# Patient Record
Sex: FEMALE | Race: BLACK OR AFRICAN AMERICAN | NOT HISPANIC OR LATINO | Employment: FULL TIME | URBAN - METROPOLITAN AREA
[De-identification: names, ages, dates, MRNs, and addresses within clinical notes are randomized per-mention and may not be internally consistent; named-entity substitution may affect disease eponyms.]

---

## 2024-01-04 ENCOUNTER — HOSPITAL ENCOUNTER (EMERGENCY)
Facility: HOSPITAL | Age: 64
Discharge: HOME | End: 2024-01-04
Attending: EMERGENCY MEDICINE
Payer: MEDICAID

## 2024-01-04 VITALS
DIASTOLIC BLOOD PRESSURE: 65 MMHG | SYSTOLIC BLOOD PRESSURE: 145 MMHG | HEART RATE: 77 BPM | BODY MASS INDEX: 28.14 KG/M2 | TEMPERATURE: 99 F | RESPIRATION RATE: 20 BRPM | HEIGHT: 69 IN | OXYGEN SATURATION: 99 % | WEIGHT: 190 LBS

## 2024-01-04 DIAGNOSIS — J06.9 UPPER RESPIRATORY TRACT INFECTION, UNSPECIFIED TYPE: Primary | ICD-10-CM

## 2024-01-04 DIAGNOSIS — R05.9 COUGH: ICD-10-CM

## 2024-01-04 LAB
INFLUENZA A, MOLECULAR: NEGATIVE
INFLUENZA B, MOLECULAR: NEGATIVE
SARS-COV-2 RDRP RESP QL NAA+PROBE: NEGATIVE
SPECIMEN SOURCE: NORMAL

## 2024-01-04 PROCEDURE — 99284 EMERGENCY DEPT VISIT MOD MDM: CPT | Mod: 25

## 2024-01-04 PROCEDURE — 25000003 PHARM REV CODE 250: Performed by: EMERGENCY MEDICINE

## 2024-01-04 PROCEDURE — U0002 COVID-19 LAB TEST NON-CDC: HCPCS | Performed by: NURSE PRACTITIONER

## 2024-01-04 PROCEDURE — 87502 INFLUENZA DNA AMP PROBE: CPT | Performed by: PHYSICIAN ASSISTANT

## 2024-01-04 RX ORDER — FEXOFENADINE HCL 60 MG
60 TABLET ORAL 2 TIMES DAILY
Qty: 14 TABLET | Refills: 0 | Status: SHIPPED | OUTPATIENT
Start: 2024-01-04 | End: 2024-01-11

## 2024-01-04 RX ORDER — DOXYCYCLINE 100 MG/1
100 CAPSULE ORAL 2 TIMES DAILY
Qty: 9 CAPSULE | Refills: 0 | Status: SHIPPED | OUTPATIENT
Start: 2024-01-04 | End: 2024-01-09

## 2024-01-04 RX ORDER — BENZONATATE 200 MG/1
200 CAPSULE ORAL 3 TIMES DAILY PRN
Qty: 15 CAPSULE | Refills: 0 | Status: SHIPPED | OUTPATIENT
Start: 2024-01-04 | End: 2024-01-09

## 2024-01-04 RX ORDER — DOXYCYCLINE HYCLATE 100 MG
100 TABLET ORAL
Status: COMPLETED | OUTPATIENT
Start: 2024-01-04 | End: 2024-01-04

## 2024-01-04 RX ADMIN — DOXYCYCLINE HYCLATE 100 MG: 100 TABLET, FILM COATED ORAL at 11:01

## 2024-01-05 NOTE — ED PROVIDER NOTES
Encounter Date: 1/4/2024       History     Chief Complaint   Patient presents with    Cough     Reports yellow and green sputum     This patient is a 63-year-old female No past medical history on file.  Presenting to the emergency department complaints of cough for 3 days.  She reports chest congestion and now she is coughing up yellow to greenish sputum.  She denies a history of chronic lung disease or smoking.  She reports having 1 person in the household with similar symptoms prior to onset of her own.  She denies significant wheezing or difficulty breathing.  No extremity swelling or past history of DVT or PE or congestive heart failure.  She denies chest pain arrest, nausea, vomiting, abdominal pain.  No pain or cough medications taken prior to arrival.  She denies past history of pneumonia.      Review of patient's allergies indicates:  Not on File  No past medical history on file.  No past surgical history on file.  No family history on file.     Review of Systems    Physical Exam     Initial Vitals [01/04/24 1645]   BP Pulse Resp Temp SpO2   (!) 161/71 88 20 99.7 °F (37.6 °C) 97 %      MAP       --         Physical Exam    Nursing note and vitals reviewed.  Constitutional: She appears well-developed and well-nourished.   HENT:   Head: Normocephalic and atraumatic.   Eyes: Conjunctivae and EOM are normal.   Neck:   Normal range of motion.  Cardiovascular:  Normal rate and regular rhythm.           Pulmonary/Chest: No respiratory distress. She has no wheezes. She has no rales.   Abdominal: She exhibits no distension.   Musculoskeletal:         General: Normal range of motion.      Cervical back: Normal range of motion.     Neurological: She is alert and oriented to person, place, and time. She has normal strength. No cranial nerve deficit. GCS score is 15. GCS eye subscore is 4. GCS verbal subscore is 5. GCS motor subscore is 6.   Skin: Skin is warm and dry. Capillary refill takes less than 2 seconds.  "  Psychiatric: She has a normal mood and affect. Thought content normal.         ED Course   Procedures  Labs Reviewed   INFLUENZA A & B BY MOLECULAR   SARS-COV-2 RNA AMPLIFICATION, QUAL          Imaging Results              X-Ray Chest PA And Lateral (Final result)  Result time 01/04/24 22:27:45      Final result by Srinath Bradshaw MD (01/04/24 22:27:45)                   Impression:      Minimal increased ground-glass attenuation overlying the lung bases and right upper lobe.  Suggest correlation for low-grade infectious/inflammatory process such as pneumonitis or small volume aspiration.    No confluent area of consolidation identified.      Electronically signed by: Srinath Bradshaw MD  Date:    01/04/2024  Time:    22:27               Narrative:    EXAMINATION:  XR CHEST PA AND LATERAL    CLINICAL HISTORY:  Provided history is "  Cough, unspecified".    TECHNIQUE:  Frontal and lateral views of the chest were performed.    COMPARISON:  None.    FINDINGS:  Cardiac silhouette is not enlarged. Scarring versus subsegmental atelectasis or minimal ground-glass opacities in the right upper lobe.  Minimal increased ground-glass attenuation overlying the lung bases which could be exaggerated by soft tissue attenuation of the x-ray beam.  No confluent area of consolidation.  Mild biapical scarring.  No sizable pleural effusion.  No pneumothorax.                                       Medications   doxycycline tablet 100 mg (100 mg Oral Given 1/4/24 8399)     Medical Decision Making  Patient was emergently assessed shortly after arrival.  Initial vital signs are stable.  She exhibits normal work of breathing and oxygen saturations on room air.  Influenza and COVID testing are negative.  Chest x-ray indicates minimal increase in ground-glass attenuation over the lung bases and right upper lobe, may correlate to low-grade infectious or inflammatory process.  I do not suspect aspiration but will initiate therapy with " doxycycline for empiric coverage of atypical infectious process.  She is further educated about supportive care and will be discharged with antitussive and decongestant medication.  She is asked to monitor symptoms closely at home and follow up with her primary care doctor soon as possible.  She is asked to return the emergency department immediately for any new, concerning, or worsening symptoms including difficulty breathing.  Patient was agreeable with this plan and was discharged in stable condition.    Amount and/or Complexity of Data Reviewed  Radiology: ordered.    Risk  Prescription drug management.               ED Course as of 01/05/24 0119   u Jan 04, 2024 1938 SARS-CoV-2 RNA, Amplification, Qual: Negative [AT]   1939 Influenza A, Molecular: Negative [AT]   1939 Influenza B, Molecular: Negative [AT]   1939 BP(!): 161/71 [AT]   1939 Temp: 99.7 °F (37.6 °C) [AT]   1939 Temp Source: Oral [AT]   1939 Pulse: 88 [AT]   1939 Resp: 20 [AT]   1939 SpO2: 97 % [AT]      ED Course User Index  [AT] Kaelyn Chavez FNP                           Clinical Impression:  Final diagnoses:  [R05.9] Cough  [J06.9] Upper respiratory tract infection, unspecified type (Primary)          ED Disposition Condition    Discharge Stable          ED Prescriptions       Medication Sig Dispense Start Date End Date Auth. Provider    doxycycline (VIBRAMYCIN) 100 MG Cap Take 1 capsule (100 mg total) by mouth 2 (two) times daily. for 5 days 9 capsule 1/4/2024 1/9/2024 Deandre Marti MD    benzonatate (TESSALON) 200 MG capsule Take 1 capsule (200 mg total) by mouth 3 (three) times daily as needed for Cough. 15 capsule 1/4/2024 1/9/2024 Deandre Marti MD    fexofenadine (ALLEGRA) 60 MG tablet Take 1 tablet (60 mg total) by mouth 2 (two) times daily. for 7 days 14 tablet 1/4/2024 1/11/2024 Deandre Marti MD          Follow-up Information       Follow up With Specialties Details Why Contact Info    Post, MD [facility]  Medicine Schedule an appointment as soon as possible for a visit   [address] [facility] [address]  [phone]               Deandre Marti MD  01/05/24 0119

## 2024-01-05 NOTE — ED TRIAGE NOTES
Juan Woodard, a 63 y.o. female presents to the ED w/ complaint of cough. Pt reports coughing up yellow/green sputum since Monday. +Fevers and chills.     Triage note:  Chief Complaint   Patient presents with    Cough     Reports yellow and green sputum     Review of patient's allergies indicates:  Not on File  No past medical history on file.   Patient identifiers for Juan Woodard checked and correct.    LOC: The patient is awake, alert and aware of environment with an appropriate affect, the patient is oriented x 4 and speaking appropriately.    APPEARANCE: Patient resting comfortably and in no acute distress, patient is clean and well groomed, patient's clothing is properly fastened.    SKIN: The skin is warm and dry, color consistent with ethnicity, patient has normal skin turgor and moist mucus membranes, skin intact, no breakdown or bruising noted.    MUSCULOSKELETAL: Patient moving all extremities well, no obvious swelling or deformities noted.    RESPIRATORY: Airway is open and patent, respirations are spontaneous and even, patient has a normal effort and rate. +cough and yellow/green sputum     CARDIAC: Patient has a normal rate and rhythm, no periphreal edema noted, capillary refill < 3 seconds.    ABDOMEN: Soft and non tender to palpation, no distention noted. Patient denies any nausea, vomiting, diarrhea, or constipation.     NEUROLOGIC: Eyes open spontaneously, PERRL, behavior appropriate to situation, follows commands, facial expression symmetrical, bilateral hand grasp equal and even, purposeful motor response noted, normal sensation in all extremities.     HEENT: No abnormalities noted. White sclera and pupils equal round and reactive to light. Denies headache, dizziness.     : Pt voids independently, denies dysuria, hematuria, frequency.

## 2024-01-05 NOTE — FIRST PROVIDER EVALUATION
Emergency Department TeleTriage Encounter Note      CHIEF COMPLAINT    Chief Complaint   Patient presents with    Cough     Reports yellow and green sputum       VITAL SIGNS   Initial Vitals [01/04/24 1645]   BP Pulse Resp Temp SpO2   (!) 161/71 88 20 99.7 °F (37.6 °C) 97 %      MAP       --            ALLERGIES    Review of patient's allergies indicates:  Not on File    PROVIDER TRIAGE NOTE  Patient presents with complaint of cough and congestion for few days.  Reports productive cough.      Phy:   Constitutional: well nourished, well developed, appearing stated age, NAD        Initial orders will be placed and care will be transferred to an alternate provider when patient is roomed for a full evaluation. Any additional orders and the final disposition will be determined by that provider.        ORDERS  Labs Reviewed   HIV 1 / 2 ANTIBODY   HEPATITIS C ANTIBODY       ED Orders (720h ago, onward)      Start Ordered     Status Ordering Provider    01/04/24 1647 01/04/24 1646  HIV 1/2 Ag/Ab (4th Gen)  STAT         Ordered ELLY ROLAND    01/04/24 1647 01/04/24 1646  Hepatitis C Antibody  STAT         Ordered ELLY ROLAND              Virtual Visit Note: The provider triage portion of this emergency department evaluation and documentation was performed via [employer], a HIPAA-compliant telemedicine application, in concert with a tele-presenter in the room. A face to face patient evaluation with one of my colleagues will occur once the patient is placed in an emergency department room.      DISCLAIMER: This note was prepared with [employer] voice recognition transcription software. Garbled syntax, mangled pronouns, and other bizarre constructions may be attributed to that software system.